# Patient Record
Sex: MALE | Race: WHITE
[De-identification: names, ages, dates, MRNs, and addresses within clinical notes are randomized per-mention and may not be internally consistent; named-entity substitution may affect disease eponyms.]

---

## 2021-12-09 ENCOUNTER — HOSPITAL ENCOUNTER (OUTPATIENT)
Dept: HOSPITAL 63 - US | Age: 66
End: 2021-12-09
Attending: SPECIALIST
Payer: MEDICARE

## 2021-12-09 DIAGNOSIS — F17.211: ICD-10-CM

## 2021-12-09 DIAGNOSIS — I25.10: Primary | ICD-10-CM

## 2021-12-09 DIAGNOSIS — K76.89: ICD-10-CM

## 2021-12-09 DIAGNOSIS — I70.0: ICD-10-CM

## 2021-12-09 PROCEDURE — 71271 CT THORAX LUNG CANCER SCR C-: CPT

## 2021-12-09 PROCEDURE — 76705 ECHO EXAM OF ABDOMEN: CPT

## 2021-12-09 NOTE — RAD
Site ID: [id number]



EXAMINATION: US ABDOMEN OR LOWER BACK LIMITED. 



TECHNIQUE: Sonographic evaluation of the  right upper quadrant  with representative images obtained



HISTORY: 66 years  Male  Reason: RUQ PAIN .



COMPARISON:  None.



FINDINGS:

The pancreas is  visualized portions appear grossly unremarkable.



The liver is measures 14.6 cm craniocaudally.  Echogenicity of the hepatic parenchyma is slightly inc
reased.  No focal liver masses are identified.  The portal vein is patent and demonstrates appropriat
e direction of flow.



The CBD caliber is 3 mm.



The gallbladder demonstrates no stones, wall thickening or pericholecystic fluid. Sonographic Boles 
sign is reportedly negative.  



The right kidney measures 10.7 cm in length. No hydronephrosis.



No fluid collection in the upper right abdomen is seen.



IMPRESSION:

Slightly increased liver echogenicity could correlate with hepatitis or fatty infiltration.



Electronically signed by: Gustavo Mcpherson MD (12/9/2021 9:21 AM) [id number]

## 2021-12-09 NOTE — RAD
EXAM: CT CHEST WITHOUT CONTRAST (LDCT LUNG CANCER SCREENING).



HISTORY: Risk factors for pulmonary malignancy. Previous smoker for 20 years with one pack a day (20 
pack-years) quit 8 years ago. Nonsymptomatic



TECHNIQUE: CT of the chest was performed without intravenous contrast using a low-dose lung screening
 protocol. Findings analysis is based on ACR Lung-RADS v1.1. *One or more of the following individual
ized dose reduction techniques were utilized for this examination:  

1. Automated exposure control.  

2. Adjustment of the mA and/or kV according to patient size.  

3. Use of iterative reconstruction technique.



RADIATION DOSE:

DLP: 104

CTDI VOL(per sequence): 2.67



COMPARISON: None. 





FINDINGS:



Nodules: No clinically suspicious nodules. Minimal linear opacities likely atelectasis.



Other findings: Images of the upper abdomen reveal no acute abnormality. Bone windows reveal no suspi
cious lesions.



There are no pathologically enlarged mediastinal or axillary lymph nodes. There is no pleural or yvrose
cardial effusion. The heart is not enlarged. There is calcified atherosclerotic disease of the aorta.
 There is three-vessel atherosclerotic disease of the coronary arteries.



No pulmonary parenchymal process is identified.



IMPRESSION/RECOMMENDATION:

1. ACR Lung-RADS category: 1 .

2. Continue annual screening with LDCT in 12 months.





Electronically signed by: Chucho Lawson (12/9/2021 1:37 PM) [id number]